# Patient Record
Sex: FEMALE | Race: WHITE | Employment: UNEMPLOYED | URBAN - METROPOLITAN AREA
[De-identification: names, ages, dates, MRNs, and addresses within clinical notes are randomized per-mention and may not be internally consistent; named-entity substitution may affect disease eponyms.]

---

## 2017-09-06 ENCOUNTER — ANESTHESIA EVENT (OUTPATIENT)
Dept: MEDSURG UNIT | Age: 47
End: 2017-09-06
Payer: SELF-PAY

## 2017-09-07 ENCOUNTER — HOSPITAL ENCOUNTER (OUTPATIENT)
Age: 47
Setting detail: OUTPATIENT SURGERY
Discharge: HOME | End: 2017-09-07
Attending: PLASTIC SURGERY | Admitting: PLASTIC SURGERY
Payer: SELF-PAY

## 2017-09-07 ENCOUNTER — ANESTHESIA (OUTPATIENT)
Dept: MEDSURG UNIT | Age: 47
End: 2017-09-07
Payer: SELF-PAY

## 2017-09-07 VITALS
SYSTOLIC BLOOD PRESSURE: 118 MMHG | WEIGHT: 157.41 LBS | DIASTOLIC BLOOD PRESSURE: 66 MMHG | RESPIRATION RATE: 16 BRPM | HEART RATE: 74 BPM | HEIGHT: 68 IN | BODY MASS INDEX: 23.86 KG/M2 | TEMPERATURE: 98.2 F | OXYGEN SATURATION: 97 %

## 2017-09-07 LAB — HGB BLD-MCNC: 14.1 G/DL (ref 11.5–16)

## 2017-09-07 PROCEDURE — 85018 HEMOGLOBIN: CPT

## 2017-09-07 PROCEDURE — 77030020061 HC IV BLD WRMR ADMIN SET 3M -B: Performed by: ANESTHESIOLOGY

## 2017-09-07 PROCEDURE — 74011000250 HC RX REV CODE- 250: Performed by: PLASTIC SURGERY

## 2017-09-07 PROCEDURE — 77030020782 HC GWN BAIR PAWS FLX 3M -B

## 2017-09-07 PROCEDURE — 76030000013 HC AMB SURG OR TIME ADDL 0.5: Performed by: PLASTIC SURGERY

## 2017-09-07 PROCEDURE — 76030000012 HC AMB SURG OR TIME 6 TO 6.5: Performed by: PLASTIC SURGERY

## 2017-09-07 PROCEDURE — 77030002986 HC SUT PROL J&J -A: Performed by: PLASTIC SURGERY

## 2017-09-07 PROCEDURE — 77030002996 HC SUT SLK J&J -A: Performed by: PLASTIC SURGERY

## 2017-09-07 PROCEDURE — 76060000074: Performed by: PLASTIC SURGERY

## 2017-09-07 PROCEDURE — 77030010507 HC ADH SKN DERMBND J&J -B: Performed by: PLASTIC SURGERY

## 2017-09-07 PROCEDURE — 74011250636 HC RX REV CODE- 250/636: Performed by: PLASTIC SURGERY

## 2017-09-07 PROCEDURE — 77030008467 HC STPLR SKN COVD -B: Performed by: PLASTIC SURGERY

## 2017-09-07 PROCEDURE — 88305 TISSUE EXAM BY PATHOLOGIST: CPT | Performed by: PLASTIC SURGERY

## 2017-09-07 PROCEDURE — 77030033138 HC SUT PGA STRATFX J&J -B: Performed by: PLASTIC SURGERY

## 2017-09-07 PROCEDURE — 76210000036 HC AMBSU PH I REC 1.5 TO 2 HR: Performed by: PLASTIC SURGERY

## 2017-09-07 PROCEDURE — 76060000071 HC AMB SURG ANES 6 TO 6.5 HR: Performed by: PLASTIC SURGERY

## 2017-09-07 PROCEDURE — 77030013629 HC ELECTRD NDL STRY -B: Performed by: PLASTIC SURGERY

## 2017-09-07 PROCEDURE — 77030002937 HC SUT MERS J&J -B: Performed by: PLASTIC SURGERY

## 2017-09-07 PROCEDURE — 77030002888 HC SUT CHRMC J&J -A: Performed by: PLASTIC SURGERY

## 2017-09-07 PROCEDURE — 77030018836 HC SOL IRR NACL ICUM -A: Performed by: PLASTIC SURGERY

## 2017-09-07 PROCEDURE — 77030002966 HC SUT PDS J&J -A: Performed by: PLASTIC SURGERY

## 2017-09-07 PROCEDURE — 77030008684 HC TU ET CUF COVD -B: Performed by: ANESTHESIOLOGY

## 2017-09-07 PROCEDURE — 77030032490 HC SLV COMPR SCD KNE COVD -B: Performed by: PLASTIC SURGERY

## 2017-09-07 PROCEDURE — 74011250637 HC RX REV CODE- 250/637: Performed by: PLASTIC SURGERY

## 2017-09-07 PROCEDURE — 77030013908 HC PROTCT CRNL CRCH B&L -A: Performed by: PLASTIC SURGERY

## 2017-09-07 PROCEDURE — 77030011267 HC ELECTRD BLD COVD -A: Performed by: PLASTIC SURGERY

## 2017-09-07 PROCEDURE — 77030011640 HC PAD GRND REM COVD -A: Performed by: PLASTIC SURGERY

## 2017-09-07 PROCEDURE — 74011250636 HC RX REV CODE- 250/636

## 2017-09-07 PROCEDURE — 77030031139 HC SUT VCRL2 J&J -A: Performed by: PLASTIC SURGERY

## 2017-09-07 PROCEDURE — 77030026438 HC STYL ET INTUB CARD -A: Performed by: ANESTHESIOLOGY

## 2017-09-07 PROCEDURE — 77030026227 HC FUNL KELLR 2 PCH ALGN -C: Performed by: PLASTIC SURGERY

## 2017-09-07 PROCEDURE — 74011000250 HC RX REV CODE- 250

## 2017-09-07 PROCEDURE — 77030013079 HC BLNKT BAIR HGGR 3M -A: Performed by: ANESTHESIOLOGY

## 2017-09-07 PROCEDURE — 74011250636 HC RX REV CODE- 250/636: Performed by: ANESTHESIOLOGY

## 2017-09-07 DEVICE — SALINE BREAST IMPLANT WITH DIAPHRAGM VALVE, 375CC  SMOOTH ROUND SALINE
Type: IMPLANTABLE DEVICE | Site: BREAST | Status: FUNCTIONAL
Brand: MENTOR SMOOTH ROUND MODERATE PROFILE

## 2017-09-07 RX ORDER — MIDAZOLAM HYDROCHLORIDE 1 MG/ML
1 INJECTION, SOLUTION INTRAMUSCULAR; INTRAVENOUS AS NEEDED
Status: DISCONTINUED | OUTPATIENT
Start: 2017-09-07 | End: 2017-09-07 | Stop reason: HOSPADM

## 2017-09-07 RX ORDER — FENTANYL CITRATE 50 UG/ML
50 INJECTION, SOLUTION INTRAMUSCULAR; INTRAVENOUS AS NEEDED
Status: DISCONTINUED | OUTPATIENT
Start: 2017-09-07 | End: 2017-09-07 | Stop reason: HOSPADM

## 2017-09-07 RX ORDER — SUCCINYLCHOLINE CHLORIDE 20 MG/ML
INJECTION INTRAMUSCULAR; INTRAVENOUS AS NEEDED
Status: DISCONTINUED | OUTPATIENT
Start: 2017-09-07 | End: 2017-09-07 | Stop reason: HOSPADM

## 2017-09-07 RX ORDER — MIDAZOLAM HYDROCHLORIDE 1 MG/ML
0.5 INJECTION, SOLUTION INTRAMUSCULAR; INTRAVENOUS
Status: DISCONTINUED | OUTPATIENT
Start: 2017-09-07 | End: 2017-09-07 | Stop reason: HOSPADM

## 2017-09-07 RX ORDER — HYDROMORPHONE HYDROCHLORIDE 1 MG/ML
INJECTION, SOLUTION INTRAMUSCULAR; INTRAVENOUS; SUBCUTANEOUS AS NEEDED
Status: DISCONTINUED | OUTPATIENT
Start: 2017-09-07 | End: 2017-09-07 | Stop reason: HOSPADM

## 2017-09-07 RX ORDER — OXYCODONE HYDROCHLORIDE 5 MG/1
5 TABLET ORAL
Status: DISCONTINUED | OUTPATIENT
Start: 2017-09-07 | End: 2017-09-07 | Stop reason: HOSPADM

## 2017-09-07 RX ORDER — SODIUM CHLORIDE 9 MG/ML
25 INJECTION, SOLUTION INTRAVENOUS CONTINUOUS
Status: DISCONTINUED | OUTPATIENT
Start: 2017-09-07 | End: 2017-09-07 | Stop reason: HOSPADM

## 2017-09-07 RX ORDER — SODIUM CHLORIDE, SODIUM LACTATE, POTASSIUM CHLORIDE, CALCIUM CHLORIDE 600; 310; 30; 20 MG/100ML; MG/100ML; MG/100ML; MG/100ML
125 INJECTION, SOLUTION INTRAVENOUS CONTINUOUS
Status: DISCONTINUED | OUTPATIENT
Start: 2017-09-07 | End: 2017-09-07 | Stop reason: HOSPADM

## 2017-09-07 RX ORDER — LIDOCAINE HYDROCHLORIDE 10 MG/ML
0.1 INJECTION, SOLUTION EPIDURAL; INFILTRATION; INTRACAUDAL; PERINEURAL AS NEEDED
Status: DISCONTINUED | OUTPATIENT
Start: 2017-09-07 | End: 2017-09-07 | Stop reason: HOSPADM

## 2017-09-07 RX ORDER — LIDOCAINE HYDROCHLORIDE 20 MG/ML
INJECTION, SOLUTION EPIDURAL; INFILTRATION; INTRACAUDAL; PERINEURAL AS NEEDED
Status: DISCONTINUED | OUTPATIENT
Start: 2017-09-07 | End: 2017-09-07 | Stop reason: HOSPADM

## 2017-09-07 RX ORDER — BUPIVACAINE HYDROCHLORIDE AND EPINEPHRINE 2.5; 5 MG/ML; UG/ML
INJECTION, SOLUTION EPIDURAL; INFILTRATION; INTRACAUDAL; PERINEURAL AS NEEDED
Status: DISCONTINUED | OUTPATIENT
Start: 2017-09-07 | End: 2017-09-07 | Stop reason: HOSPADM

## 2017-09-07 RX ORDER — OXYCODONE HYDROCHLORIDE 5 MG/1
5-10 TABLET ORAL
Qty: 28 TAB | Refills: 0 | Status: SHIPPED | OUTPATIENT
Start: 2017-09-07

## 2017-09-07 RX ORDER — FENTANYL CITRATE 50 UG/ML
25 INJECTION, SOLUTION INTRAMUSCULAR; INTRAVENOUS
Status: COMPLETED | OUTPATIENT
Start: 2017-09-07 | End: 2017-09-07

## 2017-09-07 RX ORDER — OXYCODONE AND ACETAMINOPHEN 5; 325 MG/1; MG/1
1 TABLET ORAL AS NEEDED
Status: DISCONTINUED | OUTPATIENT
Start: 2017-09-07 | End: 2017-09-07 | Stop reason: HOSPADM

## 2017-09-07 RX ORDER — DIPHENHYDRAMINE HYDROCHLORIDE 50 MG/ML
12.5 INJECTION, SOLUTION INTRAMUSCULAR; INTRAVENOUS AS NEEDED
Status: DISCONTINUED | OUTPATIENT
Start: 2017-09-07 | End: 2017-09-07 | Stop reason: HOSPADM

## 2017-09-07 RX ORDER — SODIUM CHLORIDE 0.9 % (FLUSH) 0.9 %
5-10 SYRINGE (ML) INJECTION AS NEEDED
Status: DISCONTINUED | OUTPATIENT
Start: 2017-09-07 | End: 2017-09-07 | Stop reason: HOSPADM

## 2017-09-07 RX ORDER — SODIUM CHLORIDE 0.9 % (FLUSH) 0.9 %
5-10 SYRINGE (ML) INJECTION EVERY 8 HOURS
Status: DISCONTINUED | OUTPATIENT
Start: 2017-09-07 | End: 2017-09-07 | Stop reason: HOSPADM

## 2017-09-07 RX ORDER — MIDAZOLAM HYDROCHLORIDE 1 MG/ML
INJECTION, SOLUTION INTRAMUSCULAR; INTRAVENOUS AS NEEDED
Status: DISCONTINUED | OUTPATIENT
Start: 2017-09-07 | End: 2017-09-07 | Stop reason: HOSPADM

## 2017-09-07 RX ORDER — CEPHALEXIN 250 MG/1
500 CAPSULE ORAL 4 TIMES DAILY
Qty: 28 CAP | Refills: 0 | Status: SHIPPED | OUTPATIENT
Start: 2017-09-07

## 2017-09-07 RX ORDER — FENTANYL CITRATE 50 UG/ML
INJECTION, SOLUTION INTRAMUSCULAR; INTRAVENOUS AS NEEDED
Status: DISCONTINUED | OUTPATIENT
Start: 2017-09-07 | End: 2017-09-07 | Stop reason: HOSPADM

## 2017-09-07 RX ORDER — MORPHINE SULFATE 10 MG/ML
2 INJECTION, SOLUTION INTRAMUSCULAR; INTRAVENOUS
Status: DISCONTINUED | OUTPATIENT
Start: 2017-09-07 | End: 2017-09-07 | Stop reason: HOSPADM

## 2017-09-07 RX ORDER — ONDANSETRON 2 MG/ML
4 INJECTION INTRAMUSCULAR; INTRAVENOUS AS NEEDED
Status: DISCONTINUED | OUTPATIENT
Start: 2017-09-07 | End: 2017-09-07 | Stop reason: HOSPADM

## 2017-09-07 RX ORDER — BACITRACIN 500 [USP'U]/G
OINTMENT OPHTHALMIC AS NEEDED
Status: DISCONTINUED | OUTPATIENT
Start: 2017-09-07 | End: 2017-09-07 | Stop reason: HOSPADM

## 2017-09-07 RX ORDER — HYDROMORPHONE HYDROCHLORIDE 1 MG/ML
0.2 INJECTION, SOLUTION INTRAMUSCULAR; INTRAVENOUS; SUBCUTANEOUS
Status: DISCONTINUED | OUTPATIENT
Start: 2017-09-07 | End: 2017-09-07 | Stop reason: HOSPADM

## 2017-09-07 RX ORDER — CEFAZOLIN SODIUM IN 0.9 % NACL 2 G/50 ML
2 INTRAVENOUS SOLUTION, PIGGYBACK (ML) INTRAVENOUS
Status: COMPLETED | OUTPATIENT
Start: 2017-09-07 | End: 2017-09-07

## 2017-09-07 RX ORDER — ROCURONIUM BROMIDE 10 MG/ML
INJECTION, SOLUTION INTRAVENOUS AS NEEDED
Status: DISCONTINUED | OUTPATIENT
Start: 2017-09-07 | End: 2017-09-07 | Stop reason: HOSPADM

## 2017-09-07 RX ORDER — DEXAMETHASONE SODIUM PHOSPHATE 4 MG/ML
INJECTION, SOLUTION INTRA-ARTICULAR; INTRALESIONAL; INTRAMUSCULAR; INTRAVENOUS; SOFT TISSUE AS NEEDED
Status: DISCONTINUED | OUTPATIENT
Start: 2017-09-07 | End: 2017-09-07 | Stop reason: HOSPADM

## 2017-09-07 RX ORDER — PROPOFOL 10 MG/ML
INJECTION, EMULSION INTRAVENOUS AS NEEDED
Status: DISCONTINUED | OUTPATIENT
Start: 2017-09-07 | End: 2017-09-07 | Stop reason: HOSPADM

## 2017-09-07 RX ORDER — ONDANSETRON 4 MG/1
4 TABLET, ORALLY DISINTEGRATING ORAL
Qty: 20 TAB | Refills: 0 | Status: SHIPPED | OUTPATIENT
Start: 2017-09-07

## 2017-09-07 RX ORDER — ONDANSETRON 2 MG/ML
INJECTION INTRAMUSCULAR; INTRAVENOUS AS NEEDED
Status: DISCONTINUED | OUTPATIENT
Start: 2017-09-07 | End: 2017-09-07 | Stop reason: HOSPADM

## 2017-09-07 RX ADMIN — HYDROMORPHONE HYDROCHLORIDE 0.25 MG: 1 INJECTION, SOLUTION INTRAMUSCULAR; INTRAVENOUS; SUBCUTANEOUS at 15:21

## 2017-09-07 RX ADMIN — CEFAZOLIN 2 G: 1 INJECTION, POWDER, FOR SOLUTION INTRAMUSCULAR; INTRAVENOUS; PARENTERAL at 12:28

## 2017-09-07 RX ADMIN — FENTANYL CITRATE 25 MCG: 50 INJECTION, SOLUTION INTRAMUSCULAR; INTRAVENOUS at 16:05

## 2017-09-07 RX ADMIN — FENTANYL CITRATE 25 MCG: 50 INJECTION, SOLUTION INTRAMUSCULAR; INTRAVENOUS at 12:27

## 2017-09-07 RX ADMIN — CEFAZOLIN 2 G: 1 INJECTION, POWDER, FOR SOLUTION INTRAMUSCULAR; INTRAVENOUS; PARENTERAL at 08:28

## 2017-09-07 RX ADMIN — MIDAZOLAM HYDROCHLORIDE 3 MG: 1 INJECTION, SOLUTION INTRAMUSCULAR; INTRAVENOUS at 08:18

## 2017-09-07 RX ADMIN — FENTANYL CITRATE 25 MCG: 50 INJECTION, SOLUTION INTRAMUSCULAR; INTRAVENOUS at 15:55

## 2017-09-07 RX ADMIN — SUCCINYLCHOLINE CHLORIDE 140 MG: 20 INJECTION INTRAMUSCULAR; INTRAVENOUS at 08:22

## 2017-09-07 RX ADMIN — LIDOCAINE HYDROCHLORIDE 60 MG: 20 INJECTION, SOLUTION EPIDURAL; INFILTRATION; INTRACAUDAL; PERINEURAL at 08:22

## 2017-09-07 RX ADMIN — PROPOFOL 50 MG: 10 INJECTION, EMULSION INTRAVENOUS at 14:29

## 2017-09-07 RX ADMIN — DEXAMETHASONE SODIUM PHOSPHATE 4 MG: 4 INJECTION, SOLUTION INTRA-ARTICULAR; INTRALESIONAL; INTRAMUSCULAR; INTRAVENOUS; SOFT TISSUE at 08:52

## 2017-09-07 RX ADMIN — HYDROMORPHONE HYDROCHLORIDE 0.2 MG: 1 INJECTION, SOLUTION INTRAMUSCULAR; INTRAVENOUS; SUBCUTANEOUS at 14:40

## 2017-09-07 RX ADMIN — LIDOCAINE HYDROCHLORIDE 40 MG: 20 INJECTION, SOLUTION EPIDURAL; INFILTRATION; INTRACAUDAL; PERINEURAL at 14:40

## 2017-09-07 RX ADMIN — FENTANYL CITRATE 25 MCG: 50 INJECTION, SOLUTION INTRAMUSCULAR; INTRAVENOUS at 16:15

## 2017-09-07 RX ADMIN — SODIUM CHLORIDE, POTASSIUM CHLORIDE, SODIUM LACTATE AND CALCIUM CHLORIDE: 600; 310; 30; 20 INJECTION, SOLUTION INTRAVENOUS at 08:10

## 2017-09-07 RX ADMIN — FENTANYL CITRATE 25 MCG: 50 INJECTION, SOLUTION INTRAMUSCULAR; INTRAVENOUS at 12:10

## 2017-09-07 RX ADMIN — ROCURONIUM BROMIDE 20 MG: 10 INJECTION, SOLUTION INTRAVENOUS at 10:40

## 2017-09-07 RX ADMIN — ONDANSETRON 4 MG: 2 INJECTION INTRAMUSCULAR; INTRAVENOUS at 16:00

## 2017-09-07 RX ADMIN — OXYCODONE HYDROCHLORIDE 5 MG: 5 TABLET ORAL at 16:40

## 2017-09-07 RX ADMIN — ROCURONIUM BROMIDE 20 MG: 10 INJECTION, SOLUTION INTRAVENOUS at 09:00

## 2017-09-07 RX ADMIN — FENTANYL CITRATE 50 MCG: 50 INJECTION, SOLUTION INTRAMUSCULAR; INTRAVENOUS at 08:20

## 2017-09-07 RX ADMIN — ROCURONIUM BROMIDE 20 MG: 10 INJECTION, SOLUTION INTRAVENOUS at 11:15

## 2017-09-07 RX ADMIN — PROPOFOL 150 MG: 10 INJECTION, EMULSION INTRAVENOUS at 08:22

## 2017-09-07 RX ADMIN — HYDROMORPHONE HYDROCHLORIDE 0.2 MG: 1 INJECTION, SOLUTION INTRAMUSCULAR; INTRAVENOUS; SUBCUTANEOUS at 16:10

## 2017-09-07 RX ADMIN — ONDANSETRON 4 MG: 2 INJECTION INTRAMUSCULAR; INTRAVENOUS at 14:40

## 2017-09-07 RX ADMIN — ROCURONIUM BROMIDE 10 MG: 10 INJECTION, SOLUTION INTRAVENOUS at 12:10

## 2017-09-07 RX ADMIN — PROPOFOL 25 MG: 10 INJECTION, EMULSION INTRAVENOUS at 14:44

## 2017-09-07 RX ADMIN — MIDAZOLAM HYDROCHLORIDE 2 MG: 1 INJECTION, SOLUTION INTRAMUSCULAR; INTRAVENOUS at 10:40

## 2017-09-07 RX ADMIN — HYDROMORPHONE HYDROCHLORIDE 0.2 MG: 1 INJECTION, SOLUTION INTRAMUSCULAR; INTRAVENOUS; SUBCUTANEOUS at 15:55

## 2017-09-07 RX ADMIN — FENTANYL CITRATE 25 MCG: 50 INJECTION, SOLUTION INTRAMUSCULAR; INTRAVENOUS at 17:00

## 2017-09-07 RX ADMIN — SODIUM CHLORIDE, POTASSIUM CHLORIDE, SODIUM LACTATE AND CALCIUM CHLORIDE: 600; 310; 30; 20 INJECTION, SOLUTION INTRAVENOUS at 11:30

## 2017-09-07 RX ADMIN — SODIUM CHLORIDE, POTASSIUM CHLORIDE, SODIUM LACTATE AND CALCIUM CHLORIDE: 600; 310; 30; 20 INJECTION, SOLUTION INTRAVENOUS at 15:10

## 2017-09-07 RX ADMIN — ROCURONIUM BROMIDE 10 MG: 10 INJECTION, SOLUTION INTRAVENOUS at 08:22

## 2017-09-07 RX ADMIN — HYDROMORPHONE HYDROCHLORIDE 0.3 MG: 1 INJECTION, SOLUTION INTRAMUSCULAR; INTRAVENOUS; SUBCUTANEOUS at 14:58

## 2017-09-07 RX ADMIN — HYDROMORPHONE HYDROCHLORIDE 0.25 MG: 1 INJECTION, SOLUTION INTRAMUSCULAR; INTRAVENOUS; SUBCUTANEOUS at 15:34

## 2017-09-07 NOTE — DISCHARGE INSTRUCTIONS
Call for bleeding or breast swelling or other concerns    Ice eyes every 1 hour for 10 minutes for 24-72  Elevate head as tolerated    DISCHARGE SUMMARY from Nurse    You had 1 (5 mg) tab of oxycodone at 4:40 PM. You may have another when you get home, if you need it, then wait 4 hours for you next dose. PATIENT INSTRUCTIONS:    After general anesthesia or intravenous sedation, for 24 hours or while taking prescription Narcotics:  · Limit your activities  · Do not drive and operate hazardous machinery  · Do not make important personal or business decisions  · Do  not drink alcoholic beverages  · If you have not urinated within 8 hours after discharge, please contact your surgeon on call. Report the following to your surgeon:  · Excessive pain, swelling, redness or odor of or around the surgical area  · Temperature over 101. · Nausea and vomiting lasting longer than 4 hours or if unable to take medications  · Any signs of decreased circulation or nerve impairment to extremity: change in color, persistent  numbness, tingling, coldness or increase pain  · Any questions  If you experience any of the following symptoms as tolerated, please follow up with Dr. Dionisio Kayser. What to do at Home:  Recommended activity: Activity as tolerated and no driving for today. Recommended diet: Resume regular diet as tolerated. Nothing heavy, greasy, fatty, or fried. Please make sure you have food on your stomach prior to taking pain medication. *  Please give a list of your current medications to your Primary Care Provider. *  Please update this list whenever your medications are discontinued, doses are changed, or new medications (including over-the-counter products) are added. *  Please carry medication information at all times in case of emergency situations. Community Education:     These are general instructions for a healthy lifestyle:    No smoking/ No tobacco products/ Avoid exposure to second hand smoke. Surgeon General's Warning:  Quitting smoking now greatly reduces serious risk to your health. Obesity, smoking, and sedentary lifestyle greatly increases your risk for illnessA healthy diet, regular physical exercise & weight monitoring are important for maintaining a healthy lifestyle    You may be retaining fluid if you have a history of heart failure or if you experience any of the following symptoms:  Weight gain of 3 pounds or more overnight or 5 pounds in a week, increased swelling in our hands or feet or shortness of breath while lying flat in bed. Please call your doctor as soon as you notice any of these symptoms; do not wait until your next office visit. Recognize signs and symptoms of STROKE:    F-face looks uneven  A-arms unable to move or move unevenly  S-speech slurred or non-existent  T-time-call 911 as soon as signs and symptoms begin-DO NOT go       Back to bed or wait to see if you get better-TIME IS BRAIN. Warning Signs of HEART ATTACK     Call 911 if you have these symptoms:   Chest discomfort. Most heart attacks involve discomfort in the center of the chest that lasts more than a few minutes, or that goes away and comes back. It can feel like uncomfortable pressure, squeezing, fullness, or pain.  Discomfort in other areas of the upper body. Symptoms can include pain or discomfort in one or both arms, the back, neck, jaw, or stomach.  Shortness of breath with or without chest discomfort.  Other signs may include breaking out in a cold sweat, nausea, or lightheadedness. Don't wait more than five minutes to call 911 - MINUTES MATTER! Fast action can save your life. Calling 911 is almost always the fastest way to get lifesaving treatment. Emergency Medical Services staff can begin treatment when they arrive -- up to an hour sooner than if someone gets to the hospital by car. The discharge information has been reviewed with the patient and spouse.   The patient and spouse verbalized understanding. Discharge medications reviewed with the patient and spouse and appropriate educational materials and side effects teaching were provided.

## 2017-09-07 NOTE — ANESTHESIA PREPROCEDURE EVALUATION
Anesthetic History   No history of anesthetic complications            Review of Systems / Medical History  Patient summary reviewed, nursing notes reviewed and pertinent labs reviewed    Pulmonary  Within defined limits                 Neuro/Psych   Within defined limits           Cardiovascular  Within defined limits                Exercise tolerance: >4 METS     GI/Hepatic/Renal  Within defined limits              Endo/Other  Within defined limits           Other Findings              Physical Exam    Airway  Mallampati: I  TM Distance: > 6 cm  Neck ROM: normal range of motion   Mouth opening: Normal     Cardiovascular  Regular rate and rhythm,  S1 and S2 normal,  no murmur, click, rub, or gallop             Dental  No notable dental hx       Pulmonary  Breath sounds clear to auscultation               Abdominal  GI exam deferred       Other Findings            Anesthetic Plan    ASA: 1  Anesthesia type: general          Induction: Intravenous  Anesthetic plan and risks discussed with: Patient

## 2017-09-07 NOTE — IP AVS SNAPSHOT
[address] 
[phone] Patient: Kiara Gregg MRN: [id number] :1970 You are allergic to the following Allergen Reactions Honey Bee Treatment (Venom-Honey Bee) Anaphylaxis Aspirin Shortness of Breath Dizzy Other Medication Itching Chlorohexadine wipes- rash Recent Documentation Height Weight BMI OB Status Smoking Status 1.727 m 71.4 kg 23.93 kg/m2 Hysterectomy Former Smoker Emergency Contacts Name Discharge Info Relation Home Work Mobile Len Colon DISCHARGE CAREGIVER [3] Spouse [3] [phone] About your hospitalization You were admitted on:  2017 You last received care in the:  [facility] PACU You were discharged on:  2017 Unit phone number:  [phone] Why you were hospitalized Your primary diagnosis was:  Not on File Providers Seen During Your Hospitalizations Provider Role Specialty Primary office phone Phillip Kaminski MD Attending Provider Plastic Surgery [phone] Your Primary Care Physician (PCP) Primary Care Physician Office Phone Office [address] [phone] Follow-up Information Follow up With Details Comments Contact Info INGRID [address]o [address] [facility] [facility] 
[phone] Current Discharge Medication List  
  
START taking these medications Dose & Instructions Dispensing Information Comments Morning Noon Evening Bedtime  
 cephALEXin 250 mg capsule Commonly known as:  Lysbeth Beverage Your last dose was: Your next dose is:    
   
   
 Dose:  500 mg Take 2 Caps by mouth four (4) times daily. Quantity:  28 Cap Refills:  0  
     
   
   
   
  
 ondansetron 4 mg disintegrating tablet Commonly known as:  ZOFRAN ODT  
   
 Your last dose was: Your next dose is:    
   
   
 Dose:  4 mg Take 1 Tab by mouth every eight (8) hours as needed for Nausea. Quantity:  20 Tab Refills:  0  
     
   
   
   
  
 oxyCODONE IR 5 mg immediate release tablet Commonly known as:  Nesha Bolivar Your last dose was: Your next dose is:    
   
   
 Dose:  5-10 mg Take 1-2 Tabs by mouth every four (4) hours as needed for Pain. Max Daily Amount: 60 mg.  
 Quantity:  28 Tab Refills:  0 CONTINUE these medications which have NOT CHANGED Dose & Instructions Dispensing Information Comments Morning Noon Evening Bedtime DETROL PO Your last dose was: Your next dose is: Take  by mouth two (2) times a day. Refills:  0 STOP taking these medications MOTRIN  mg tablet Generic drug:  ibuprofen TYLENOL EXTRA STRENGTH 500 mg tablet Generic drug:  acetaminophen Where to Get Your Medications Information on where to get these meds will be given to you by the nurse or doctor. ! Ask your nurse or doctor about these medications  
  cephALEXin 250 mg capsule  
 ondansetron 4 mg disintegrating tablet  
 oxyCODONE IR 5 mg immediate release tablet Discharge Instructions Call for bleeding or breast swelling or other concerns Ice eyes every 1 hour for 10 minutes for 24-72 Elevate head as tolerated DISCHARGE SUMMARY from Nurse You had 1 (5 mg) tab of oxycodone at 4:40 PM. You may have another when you get home, if you need it, then wait 4 hours for you next dose. PATIENT INSTRUCTIONS: 
 
After general anesthesia or intravenous sedation, for 24 hours or while taking prescription Narcotics: · Limit your activities · Do not drive and operate hazardous machinery · Do not make important personal or business decisions · Do  not drink alcoholic beverages · If you have not urinated within 8 hours after discharge, please contact your surgeon on call. Report the following to your surgeon: 
· Excessive pain, swelling, redness or odor of or around the surgical area · Temperature over 101. · Nausea and vomiting lasting longer than 4 hours or if unable to take medications · Any signs of decreased circulation or nerve impairment to extremity: change in color, persistent  numbness, tingling, coldness or increase pain · Any questions If you experience any of the following symptoms as tolerated, please follow up with Dr. Chani Jama. What to do at Home: 
Recommended activity: Activity as tolerated and no driving for today. Recommended diet: Resume regular diet as tolerated. Nothing heavy, greasy, fatty, or fried. Please make sure you have food on your stomach prior to taking pain medication. *  Please give a list of your current medications to your Primary Care Provider. *  Please update this list whenever your medications are discontinued, doses are changed, or new medications (including over-the-counter products) are added. *  Please carry medication information at all times in case of emergency situations. Community Education: These are general instructions for a healthy lifestyle: No smoking/ No tobacco products/ Avoid exposure to second hand smoke. Surgeon General's Warning:  Quitting smoking now greatly reduces serious risk to your health. Obesity, smoking, and sedentary lifestyle greatly increases your risk for illnessA healthy diet, regular physical exercise & weight monitoring are important for maintaining a healthy lifestyle You may be retaining fluid if you have a history of heart failure or if you experience any of the following symptoms:  Weight gain of 3 pounds or more overnight or 5 pounds in a week, increased swelling in our hands or feet or shortness of breath while lying flat in bed.   Please call your doctor as soon as you notice any of these symptoms; do not wait until your next office visit. Recognize signs and symptoms of STROKE: 
 
F-face looks uneven A-arms unable to move or move unevenly S-speech slurred or non-existent T-time-call 911 as soon as signs and symptoms begin-DO NOT go Back to bed or wait to see if you get better-TIME IS BRAIN. Warning Signs of HEART ATTACK Call 911 if you have these symptoms: 
? Chest discomfort. Most heart attacks involve discomfort in the center of the chest that lasts more than a few minutes, or that goes away and comes back. It can feel like uncomfortable pressure, squeezing, fullness, or pain. ? Discomfort in other areas of the upper body. Symptoms can include pain or discomfort in one or both arms, the back, neck, jaw, or stomach. ? Shortness of breath with or without chest discomfort. ? Other signs may include breaking out in a cold sweat, nausea, or lightheadedness. Don't wait more than five minutes to call [address]! Fast action can save your life. Calling 911 is almost always the fastest way to get lifesaving treatment. Emergency Medical Services staff can begin treatment when they arrive  up to an hour sooner than if someone gets to the hospital by car. The discharge information has been reviewed with the patient and spouse. The patient and spouse verbalized understanding. Discharge medications reviewed with the patient and spouse and appropriate educational materials and side effects teaching were provided. Discharge Orders None Introducing [facility] SERVICES! Dear Olivia Mcmillan: Thank you for requesting a 4meee account. Our records indicate that you already have an active 4meee account. You can access your account anytime at [email or web address] Did you know that you can access your hospital and ER discharge instructions at any time in 4meee?   You can also review all of your test results from your hospital stay or ER visit. Additional Information If you have questions, please visit the Frequently Asked Questions section of the motify website at [email or web address] Remember, MyChart is NOT to be used for urgent needs. For medical emergencies, dial 911. Now available from your iPhone and Android! General Information Please provide this summary of care documentation to your next provider. Patient Signature:  ____________________________________________________________ Date:  ____________________________________________________________  
  
[facility] Provider Signature:  ____________________________________________________________ Date:  ____________________________________________________________

## 2017-09-07 NOTE — ROUTINE PROCESS
Patient: Zoe Martinez MRN: [id number]  SSN: [id number]   YOB: 1970  Age: 40 y.o. Sex: female     Patient is status post Procedure(s):  BILATERAL LOWER BLEPHAROPLASTY,  BILATERAL BREAST AUGMENTATION WITH SALINE IMPLANTS, MASTOPEXY  BREAST AUGMENTATION.     Surgeon(s) and Role:     * Pawan Ziegler MD - Primary    Local/Dose/Irrigation:  See [facility]                  Peripheral IV 09/07/17 Left Hand (Active)   Site Assessment Clean, dry, & intact 9/7/2017  7:40 AM   Phlebitis Assessment 0 9/7/2017  7:40 AM   Dressing Status Clean, dry, & intact 9/7/2017  7:40 AM   Hub Color/Line Status Pink 9/7/2017  7:40 AM   Action Taken Dressing reinforced 9/7/2017  7:40 AM            Airway - Endotracheal Tube 09/07/17 (Active)                   Dressing/Packing:  Wound Eye Left-DRESSING TYPE:  (steristrips) (09/07/17 1300)  Wound Eye Right-DRESSING TYPE:  (steristrips) (09/07/17 1300)  Wound Breast Left-DRESSING TYPE:  (dermabond, ABD, bra) (09/07/17 1300)  Wound Breast Right-DRESSING TYPE:  (dermabond, ABD, bra) (09/07/17 1300)  Splint/Cast:  ]    Other:

## 2017-09-07 NOTE — BRIEF OP NOTE
BRIEF OPERATIVE NOTE    Date of Procedure: 9/7/2017   Preoperative Diagnosis: COSMETIC  Postoperative Diagnosis: COSMETIC    Procedure(s):  BILATERAL LOWER BLEPHAROPLASTY,  BILATERAL BREAST AUGMENTATION WITH SALINE IMPLANTS, MASTOPEXY  BREAST AUGMENTATION  Surgeon(s) and Role:     * Saul Whiting MD - Primary         Assistant Staff:  Carey Brantley MD       Surgical Staff:  Circ-1: Humera Umaña RN  Circ-2: Ravindra Smart RN  Circ-Relief: Igor Lao RN  Scrub Tech-1: Barron Mae  Scrub RN-Relief: Ravindra Smart RN  Event Time In   Incision Start 0913   Incision Close 1506     Anesthesia: General   Estimated Blood Loss: Less than 25 ml  Specimens:   ID Type Source Tests Collected by Time Destination   1 : right breast tissue Fresh Breast  Saul Whiting MD 9/7/2017 1310 Pathology   2 : left breast tissue Fresh Breast  Saul Whiting MD 9/7/2017 1312 Pathology      Findings: 425 ml placed in implants  Complications: none noted  Implants:   Implant Name Type Inv.  Item Serial No.  Lot No. LRB No. Used Action   IMPL BRST SMTH MOD PROF 375ML --  - [id number]  IMPL BRST SMTH MOD PROF 375ML --  [id number] [employer] [id number] Right 1 Implanted   IMPL BRST SMTH MOD PROF 375ML --  - [id number]   IMPL BRST SMTH MOD PROF 375ML --  [id number] MENTOR [id number] Left 1 Implanted

## 2017-09-07 NOTE — BRIEF OP NOTE
BRIEF OPERATIVE NOTE    Date of Procedure: 9/7/2017   Preoperative Diagnosis: COSMETIC  Postoperative Diagnosis: COSMETIC    Procedure(s):  BILATERAL LOWER BLEPHAROPLASTY,  BILATERAL BREAST AUGMENTATION WITH SALINE IMPLANTS, MASTOPEXY  BREAST AUGMENTATION  Surgeon(s) and Role:     * Jd Merlos MD - Primary         Assistant Staff:  Agnes Poe MD, Resident physician       Surgical Staff:  Circ-1: Zahira Hill RN  Circ-2: Skip Dudley RN  Circ-Relief: Darrel Salas RN  Scrub Tech-1: Hawa Moreau  Scrub RN-Relief: Skip Dudley RN  Event Time In   Incision Start 0913   Incision Close 1506     Anesthesia: General   Estimated Blood Loss: less than 25 ml  Specimens:   ID Type Source Tests Collected by Time Destination   1 : right breast tissue Fresh Breast  Jd Merlos MD 9/7/2017 1310 Pathology   2 : left breast tissue Fresh Breast  Jd Merlos MD 9/7/2017 1312 Pathology      Findings: 425 ml saline in each implant  Complications: none noted  Implants:   Implant Name Type Inv.  Item Serial No.  Lot No. LRB No. Used Action   IMPL BRST SMTH MOD PROF 375ML --  - [id number]  IMPL BRST SMTH MOD PROF 375ML --  [id number] [employer] [id number] Right 1 Implanted   IMPL BRST SMTH MOD PROF 375ML --  - [id number]   IMPL BRST SMTH MOD PROF 375ML --  [id number] MENTOR [id number] Left 1 Implanted

## 2017-09-07 NOTE — PERIOP NOTES
Discharge instructions reviewed with patient and spouse. Opportunity for questions and clarification provided. Patient and spouse verbalized understanding of instructions and follow-up appointment information and had no additional questions or concerns. Patient voided prior to discharge. Patient discharged via wheelchair from Phase 1 area to prior home environment.

## 2017-09-08 NOTE — ANESTHESIA POSTPROCEDURE EVALUATION
Post-Anesthesia Evaluation and Assessment    Patient: Nazanin Apple MRN: [id number]  SSN: [id number]    YOB: 1970  Age: 40 y.o. Sex: female       Cardiovascular Function/Vital Signs  Visit Vitals    /66    Pulse 74    Temp 36.8 °C (98.2 °F)    Resp 16    Ht 5' 8\" (1.727 m)    Wt 71.4 kg (157 lb 6.5 oz)    SpO2 97%    BMI 23.93 kg/m2       Patient is status post general anesthesia for Procedure(s):  BILATERAL LOWER BLEPHAROPLASTY,  BILATERAL BREAST AUGMENTATION WITH SALINE IMPLANTS, MASTOPEXY  BREAST AUGMENTATION. Nausea/Vomiting: None    Postoperative hydration reviewed and adequate. Pain:  Pain Scale 1: Numeric (0 - 10) (09/07/17 1715)  Pain Intensity 1: 3 (Patient states pain is tolerable, and she is ready for d/c) (09/07/17 1715)   Managed    Neurological Status:   Neuro (WDL): Within Defined Limits (09/07/17 1630)  Neuro  Neurologic State: Sleeping (09/07/17 1630)  Orientation Level: Oriented X4 (09/07/17 1630)  LUE Motor Response: Purposeful (09/07/17 1630)  LLE Motor Response: Purposeful (09/07/17 1630)  RUE Motor Response: Purposeful (09/07/17 1630)  RLE Motor Response: Purposeful (09/07/17 1630)   At baseline    Mental Status and Level of Consciousness: Arousable    Pulmonary Status:   O2 Device: Room air (09/07/17 1715)   Adequate oxygenation and airway patent    Complications related to anesthesia: None    Post-anesthesia assessment completed.  No concerns    Signed By: Rico Otero DO     September 8, 2017

## 2017-09-10 NOTE — OP NOTES
[address] [facility] [place] [address], [address]   OP NOTE       Name:  Mark Mancilla   MR#:  [id number]   :  1970   Account #:  [de-identified]    Surgery Date:  2017   Date of Adm:  2017       PREOPERATIVE DIAGNOSIS:  Cosmetic. POSTOPERATIVE DIAGNOSIS:  Cosmetic. PROCEDURES PERFORMED:     1. Lower blepharoplasty. 2. Mastopexy augmentation. ATTENDING SURGEON: Velasquez Ley MD    HOUSE STAFF: Stanley Del Cid MD     ESTIMATED BLOOD LOSS: Less than 50 mL. SPECIMENS REMOVED: Tissue from each breast was sent separately   that was removed with the mastopexy. ANESTHESIA:  Local.    INDICATIONS: The patient is a 43-year-old who desires cosmetic   lower blepharoplasty. She also desires revision of her augmentation   with a mastopexy augmentation. DESCRIPTION OF PROCEDURE: The patient was taken to the   operating room and laid in the supine position. Attention was first   turned to the lower eyelids. The area was marked and corneal   protectors were placed. It was infiltrated with local anesthetic with   epinephrine. Following this, a 15 blade was used to make a skin   incision at the lateral canthus and Bovie electrocautery was used to   carry the dissection down to the orbital rim. A canthopexy stitch was   placed from the lateral lower lid including tarsus and ligament and   sutured to the inside of the orbital rim. Following this, a stepped   incision was made in the lower lid and the orbital septum was   visualized. With adequate retraction, dissection was first carried out   laterally and then medially. Dissection was carried primarily with a   combination of cutting and spreading with tenotomy scissors. Tear   trough was released, as well as connective tissues laterally. After   controlling hemostasis, the fat pads were incised with Bovie   electrocautery and fat was teased out.  The remaining fat and septum   was draped over in apron-like fashion and sutured with a chromic   stitch. Care was made to determine that no tension was placed on the   lower lid by this. Following this, a triangle of tissue was taken out of the   tray lateral to the canthus with some orbicularis muscle left behind,   which was sutured outside of the orbital rim to the fascia and   periosteum. Additional skin trimming was performed medially, although   this was very minimal. The patient did not have significant excess skin. Following this, a combination of running and interrupted Prolene   sutures were used to suture the lower lid. An identical procedure was   then performed on the left side. Corneal protectors were then removed   and the eyes were irrigated and protected with Tegaderm. Attention was then turned toward the mastopexy augmentation. Dissection was carried out after the patient had been marked in the   preoperative holding area. A vertical incision was made in the area   where the mastopexy would be performed, and the previous implant   was removed without difficulty. Then, 480 mL was measured to be   present in the previous implant. The pocket had no irregularities and   was thin. Following this, various sizers were placed and mastopexy   incisions were adjusted with tailor tacking. We felt that 425 mL of   saline utilizing a 375 with 50 mL overfill was the best fit for the patient's   goals. On the right, REF #[id number], lot #[id number], SN #[id number]   was placed. On the left REF #[id number], lot #[id number], and SN   #[id number] was placed. They were both inflated with the same   identical amounts of saline. To complete the mastopexy, a superior   pedicle was used and a horizontal and vertical ellipse of tissue was   excised. Following confirming hemostasis, the inverted T portion was   sewn with a 2-0 Vicryl and then 3-0 and 4-0 PDS were used for the   remainder of incisions, followed by Stratafix subcuticular for skin. Dermabond was then applied.  Both sides were treated very similarly or   almost identically for symmetry. We sat the patient in the sitting   position to confirm good symmetry and breast shape prior to leaving   the operating room. A bra, followed by dry sterile dressings were   applied at the end of the procedure. The patient tolerated the   procedure well and no complications noted. Specimens: Tissue from   each breast was sent separately that was removed with the   mastopexy. Findings were as expected. COMPLICATIONS: None noted at the time of the procedure.         MD JÚNIOR Corado / DEANGELO   D:  09/09/2017   19:19   T:  09/09/2017   21:56   Job #:  [id number]

## 2024-08-29 ENCOUNTER — OFFICE VISIT (OUTPATIENT)
Age: 54
End: 2024-08-29

## 2024-08-29 ENCOUNTER — TELEPHONE (OUTPATIENT)
Age: 54
End: 2024-08-29

## 2024-08-29 VITALS
BODY MASS INDEX: 25.75 KG/M2 | RESPIRATION RATE: 16 BRPM | HEART RATE: 75 BPM | TEMPERATURE: 97.7 F | DIASTOLIC BLOOD PRESSURE: 77 MMHG | HEIGHT: 68 IN | SYSTOLIC BLOOD PRESSURE: 111 MMHG | OXYGEN SATURATION: 98 % | WEIGHT: 169.9 LBS

## 2024-08-29 DIAGNOSIS — J02.0 STREP PHARYNGITIS: Primary | ICD-10-CM

## 2024-08-29 DIAGNOSIS — B37.9 ANTIBIOTIC-INDUCED YEAST INFECTION: ICD-10-CM

## 2024-08-29 DIAGNOSIS — H66.90 ACUTE OTITIS MEDIA, UNSPECIFIED OTITIS MEDIA TYPE: ICD-10-CM

## 2024-08-29 DIAGNOSIS — T36.95XA ANTIBIOTIC-INDUCED YEAST INFECTION: ICD-10-CM

## 2024-08-29 LAB
STREP PYOGENES DNA, POC: NEGATIVE
VALID INTERNAL CONTROL, POC: NORMAL

## 2024-08-29 RX ORDER — CEFDINIR 300 MG/1
300 CAPSULE ORAL 2 TIMES DAILY
Qty: 20 CAPSULE | Refills: 0 | Status: SHIPPED | OUTPATIENT
Start: 2024-08-29 | End: 2024-09-08

## 2024-08-29 RX ORDER — FLUCONAZOLE 150 MG/1
150 TABLET ORAL ONCE
Qty: 1 TABLET | Refills: 0 | Status: SHIPPED | OUTPATIENT
Start: 2024-08-29 | End: 2024-08-29

## 2024-08-29 NOTE — PROGRESS NOTES
Anita Light (:  1970) is a 53 y.o. female,New patient, here for evaluation of the following chief complaint(s):  Otalgia (Pt c/o Left ear ache feels a knot behind the ear,sore throat, fatigue.) and Pharyngitis       ASSESSMENT/PLAN:  1. Strep pharyngitis  -     AMB POC STREP GO A DIRECT, DNA PROBE  2. Acute otitis media, unspecified otitis media type  -     cefdinir (OMNICEF) 300 MG capsule; Take 1 capsule by mouth 2 times daily for 10 days, Disp-20 capsule, R-0Normal  3. Antibiotic-induced yeast infection  -     fluconazole (DIFLUCAN) 150 MG tablet; Take 1 tablet by mouth once for 1 dose, Disp-1 tablet, R-0Normal      Results for orders placed or performed in visit on 24   AMB POC STREP GO A DIRECT, DNA PROBE   Result Value Ref Range    Valid Internal Control, POC Pass     Strep pyogenes DNA, POC Negative Not Detected     Take antibiotic for otitis media. Finish medication.   Drink plenty of water.  Follow up with your PCP in 7 days.  Call or return to clinic if no improvement or any worsening  Patient comfortable with plan         SUBJECTIVE/OBJECTIVE:    History provided by:  Patient   used: No          53 y.o. female presents with symptoms of Ear Pain  Patient complains of ear pain and possible ear infection. Symptoms include bilateral ear drainage , bilateral ear pain, and swollen glands. Onset of symptoms was a few days ago, gradually worsening since that time. She also c/o 2 days nasal congestion, sinus pressure, and sore throat.  She is drinking plenty of fluids    Sore Throat  Patient complains of sore throat. Associated symptoms include nasal blockage, post nasal drip, sinus and nasal congestion, sore throat, and swollen glands.Onset of symptoms was 2 days ago, gradually worsening since that time. She is drinking plenty of fluids. She has not had recent close exposure to someone with proven streptococcal pharyngitis.         Vitals:    24 1547   BP: 111/77

## 2025-01-02 ENCOUNTER — OFFICE VISIT (OUTPATIENT)
Age: 55
End: 2025-01-02

## 2025-01-02 VITALS
BODY MASS INDEX: 23.87 KG/M2 | OXYGEN SATURATION: 96 % | SYSTOLIC BLOOD PRESSURE: 116 MMHG | WEIGHT: 157 LBS | HEART RATE: 77 BPM | DIASTOLIC BLOOD PRESSURE: 80 MMHG | TEMPERATURE: 98.2 F

## 2025-01-02 DIAGNOSIS — J01.90 ACUTE NON-RECURRENT SINUSITIS, UNSPECIFIED LOCATION: Primary | ICD-10-CM

## 2025-01-02 RX ORDER — GUAIFENESIN 600 MG/1
600 TABLET, EXTENDED RELEASE ORAL 2 TIMES DAILY
Qty: 30 TABLET | Refills: 0 | Status: SHIPPED | OUTPATIENT
Start: 2025-01-02 | End: 2025-01-17

## 2025-01-02 RX ORDER — PANTOPRAZOLE SODIUM 20 MG/1
20 TABLET, DELAYED RELEASE ORAL DAILY
COMMUNITY

## 2025-01-02 RX ORDER — METHYLPREDNISOLONE 4 MG/1
TABLET ORAL
Qty: 21 KIT | Refills: 0 | Status: SHIPPED | OUTPATIENT
Start: 2025-01-02

## 2025-01-02 NOTE — PROGRESS NOTES
Subjective     Chief Complaint   Patient presents with    Sinusitis     Sinus congestion, chest congestion,  runny nose ,sore throat, thick mucus and headache since last Thursday         Patient is 54 year old female presenting with sore throat, voice hoarseness, sneezing, fatigue, chest tightness.  Symptoms began around Arlette time.  She reports coughing up green phlegm.  Denies fever or chills.  She has been taking Motrin and Tylenol for relief.      Sinusitis        History reviewed. No pertinent past medical history.    History reviewed. No pertinent surgical history.    History reviewed. No pertinent family history.    Allergies   Allergen Reactions    Aspirin Hives and Shortness Of Breath       Social History     Tobacco Use    Smoking status: Former     Current packs/day: 23.00     Types: Cigarettes    Smokeless tobacco: Never       Vitals:    01/02/25 1121   BP: 116/80   Pulse: 77   Temp: 98.2 °F (36.8 °C)   SpO2: 96%       Objective     Physical Exam  Vitals and nursing note reviewed.   Constitutional:       General: She is not in acute distress.     Appearance: Normal appearance. She is not ill-appearing.   HENT:      Head: Normocephalic and atraumatic.      Mouth/Throat:      Mouth: Mucous membranes are moist.      Pharynx: Posterior oropharyngeal erythema present.   Cardiovascular:      Rate and Rhythm: Normal rate and regular rhythm.      Pulses: Normal pulses.      Heart sounds: Normal heart sounds.   Pulmonary:      Effort: Pulmonary effort is normal.      Breath sounds: Normal breath sounds.   Skin:     General: Skin is warm and dry.   Neurological:      Mental Status: She is alert and oriented to person, place, and time.         Assessment & Plan     Diagnoses and all orders for this visit:  Acute non-recurrent sinusitis, unspecified location  -     XR CHEST (2 VIEWS); Future  -     guaiFENesin (MUCINEX) 600 MG extended release tablet; Take 1 tablet by mouth 2 times daily for 15 days  -

## 2025-01-02 NOTE — PATIENT INSTRUCTIONS
Thank you for visiting [facility] today.    Flonase (over the counter) nasal spray, once a day  Saline nasal sprays 1-3 days   Afrin nasal spray for no longer than 3-5 days  Ibuprofen (400-800 mg) every 8 hours; Tylenol 325-500 mg every 6 hours)   Zyrtec/Xyzal/Allegra/Claritin during the day or Benadryl at night may help with allergies.  You may use the decongestant version of these medications as well.  Simple foods like chicken noodle soup, smoothies, hot tea with lemon and honey may also help  Steam inhalation, humidifier, warm compresses  Increase oral fluids to maintain hydration  Vitamin D 4000 IU each day for one month  1/2 teaspoon turmeric each day for anti-inflammation  Avoid smoking and minimize contact with environmental irritants      Please follow up with your primary care provider if your symptoms last more than 10 days or worsen.    Please go immediately to the Emergency Department if you develop:  Fever higher than 102F (38.9C), sudden and severe pain in the face and head, trouble seeing or seeing double, trouble thinking clearly, swelling or redness around one or both eyes or a stiff neck

## 2025-01-03 PROBLEM — K59.1 DIARRHEA, FUNCTIONAL: Status: ACTIVE | Noted: 2025-01-03

## 2025-01-03 PROBLEM — Z98.890 HISTORY OF LOOP ELECTROSURGICAL EXCISION PROCEDURE (LEEP): Status: ACTIVE | Noted: 2022-07-14

## 2025-01-03 PROBLEM — K21.9 ACID REFLUX: Status: ACTIVE | Noted: 2025-01-03

## 2025-01-03 PROBLEM — Z87.891 EX-SMOKER: Status: ACTIVE | Noted: 2022-07-14

## 2025-01-03 PROBLEM — Z98.82 HISTORY OF BREAST AUGMENTATION: Status: ACTIVE | Noted: 2022-07-14

## 2025-01-03 PROBLEM — K57.30 DIVERTICULOSIS OF COLON WITHOUT HEMORRHAGE: Status: ACTIVE | Noted: 2025-01-03

## 2025-01-03 PROBLEM — R20.0 ABSENCE OF SENSATION: Status: ACTIVE | Noted: 2022-07-14

## 2025-01-03 PROBLEM — G97.1 HEADACHE FOLLOWING LUMBAR PUNCTURE: Status: ACTIVE | Noted: 2018-07-21

## 2025-01-03 PROBLEM — R19.8 ALTERNATING CONSTIPATION AND DIARRHEA: Status: ACTIVE | Noted: 2025-01-03

## 2025-01-03 PROBLEM — K92.1 BLOOD IN STOOL: Status: ACTIVE | Noted: 2025-01-03

## 2025-01-03 PROBLEM — R19.8 CHANGE IN BOWEL FUNCTION: Status: ACTIVE | Noted: 2025-01-03

## 2025-01-03 PROBLEM — N87.9 DYSPLASIA OF CERVIX: Status: ACTIVE | Noted: 2022-07-14

## 2025-01-03 PROBLEM — Z80.0 FAMILY HISTORY OF MALIGNANT NEOPLASM OF DIGESTIVE ORGANS: Status: ACTIVE | Noted: 2025-01-03

## 2025-01-03 PROBLEM — R51.9 HEADACHE: Status: ACTIVE | Noted: 2018-07-21

## 2025-01-03 PROBLEM — N83.209 CYST OF OVARY: Status: ACTIVE | Noted: 2025-01-03

## (undated) DEVICE — DRAPE,CHEST,FENES,15X10,STERIL: Brand: MEDLINE

## (undated) DEVICE — INTENDED FOR TISSUE SEPARATION, AND OTHER PROCEDURES THAT REQUIRE A SHARP SURGICAL BLADE TO PUNCTURE OR CUT.: Brand: BARD-PARKER ® CARBON RIB-BACK BLADES

## (undated) DEVICE — FUNNEL 2 KELLER

## (undated) DEVICE — CROUCH CORNEAL PROTECTOR: Brand: BAUSCH + LOMB

## (undated) DEVICE — STERILE POLYISOPRENE POWDER-FREE SURGICAL GLOVES: Brand: PROTEXIS

## (undated) DEVICE — SYR 50ML LR LCK 1ML GRAD NSAF --

## (undated) DEVICE — MEDI-VAC NON-CONDUCTIVE SUCTION TUBING: Brand: CARDINAL HEALTH

## (undated) DEVICE — Device

## (undated) DEVICE — DERMABOND SKIN ADH 0.7ML -- DERMABOND ADVANCED 12/BX

## (undated) DEVICE — (D)STRIP SKN CLSR 0.5X4IN WHT --

## (undated) DEVICE — SPONGE LAP 18X18IN STRL -- 5/PK

## (undated) DEVICE — SOLUTION IV 1000ML 0.9% SOD CHL

## (undated) DEVICE — SURGICAL PROCEDURE PACK BASIN MAJ SET CUST NO CAUT

## (undated) DEVICE — GOWN,ECLIPSE,POLYRNF,W/TWL,L,30/CS: Brand: MEDLINE

## (undated) DEVICE — SLIM BODY SKIN STAPLER: Brand: APPOSE ULC

## (undated) DEVICE — MICRODISSECTION NEEDLE STRAIGHT SLEEVE: Brand: COLORADO

## (undated) DEVICE — DRAPE,REIN 53X77,STERILE: Brand: MEDLINE

## (undated) DEVICE — BRA COMPR MAMM SILKY 3XL BGE

## (undated) DEVICE — KENDALL SCD EXPRESS SLEEVES, KNEE LENGTH, MEDIUM: Brand: KENDALL SCD

## (undated) DEVICE — GAUZE SPONGES,12 PLY: Brand: CURITY

## (undated) DEVICE — SUTURE PERMAHAND SZ 2-0 L18IN NONABSORBABLE BLK L26MM PS 1588H

## (undated) DEVICE — SIMPLICITY FLUFF UNDERPAD 23X36, MODERATE: Brand: SIMPLICITY

## (undated) DEVICE — SUTURE VCRL SZ 5-0 L18IN ABSRB UD P-3 L13MM 3/8 CIR PRIM J493H

## (undated) DEVICE — (D)SYR 10ML 1/5ML GRAD NSAF -- PKGING CHANGE USE ITEM 338027

## (undated) DEVICE — SUTURE PDS II SZ 3-0 L27IN ABSRB VLT L26MM SH 1/2 CIR Z316H

## (undated) DEVICE — TOWEL SURG W17XL27IN STD BLU COT NONFENESTRATED PREWASHED

## (undated) DEVICE — SUTURE STRATAFIX SPRL SZ 4-0 L12IN ABSRB UD FS-2 L19MM 3/8 SXMP2B409

## (undated) DEVICE — MASTISOL ADHESIVE LIQ 2/3ML

## (undated) DEVICE — SUTURE PDS II SZ 4-0 L18IN ABSRB UD L19MM PS-2 3/8 CIR PRIM Z496G

## (undated) DEVICE — NEEDLE HYPO 25GA L1.5IN BLU POLYPR HUB S STL REG BVL STR

## (undated) DEVICE — INFECTION CONTROL KIT SYS

## (undated) DEVICE — EYE PADSSTERILENOT MADE WITH NATURAL RUBBER LATEXSINGLE USE ONLYDO NOT USE IF PACKAGE OPENED OR DAMAGED: Brand: CARDINAL HEALTH

## (undated) DEVICE — SWAB SURG L76CM COT ROUNDED PT TIP VISISWAB

## (undated) DEVICE — SUTURE PDS II SZ 3-0 L27IN ABSRB CLR SH L26MM 1/2 CIR TAPR Z416H

## (undated) DEVICE — GOWN,SIRUS,FABRNF,XL,20/CS: Brand: MEDLINE

## (undated) DEVICE — SUTURE CHROMIC GUT SZ 5-0 L18IN ABSRB BRN P-3 L13MM 3/8 CIR 687G

## (undated) DEVICE — TRAY PREP DRY W/ PREM GLV 2 APPL 6 SPNG 2 UNDPD 1 OVERWRAP

## (undated) DEVICE — NEEDLE HYPO 25GA L1.5IN BVL ORIENTED ECLIPSE

## (undated) DEVICE — ROCKER SWITCH PENCIL BLADE ELECTRODE, HOLSTER: Brand: EDGE

## (undated) DEVICE — SUTURE PROL SZ 6-0 L18IN NONABSORBABLE BLU L36MM P-1 3/8 8697G

## (undated) DEVICE — INSULATED BLADE ELECTRODE: Brand: EDGE

## (undated) DEVICE — REM POLYHESIVE ADULT PATIENT RETURN ELECTRODE: Brand: VALLEYLAB

## (undated) DEVICE — PACK,EENT,TURBAN DRAPE,PK II: Brand: MEDLINE

## (undated) DEVICE — SUTURE MERS SZ 4-0 L18IN NONABSORBABLE WHT L8MM S-2 1/2 CIR 1779G

## (undated) DEVICE — 3M™ TEGADERM™ TRANSPARENT FILM DRESSING FRAME STYLE, 1626W, 4 IN X 4-3/4 IN (10 CM X 12 CM), 50/CT 4CT/CASE: Brand: 3M™ TEGADERM™

## (undated) DEVICE — ABDOMINAL PAD: Brand: DERMACEA